# Patient Record
Sex: FEMALE | Race: BLACK OR AFRICAN AMERICAN
[De-identification: names, ages, dates, MRNs, and addresses within clinical notes are randomized per-mention and may not be internally consistent; named-entity substitution may affect disease eponyms.]

---

## 2017-12-26 ENCOUNTER — HOSPITAL ENCOUNTER (INPATIENT)
Dept: HOSPITAL 92 - ERS | Age: 47
LOS: 3 days | Discharge: HOME | DRG: 273 | End: 2017-12-29
Attending: INTERNAL MEDICINE | Admitting: INTERNAL MEDICINE
Payer: SELF-PAY

## 2017-12-26 VITALS — BODY MASS INDEX: 43.4 KG/M2

## 2017-12-26 DIAGNOSIS — I47.1: Primary | ICD-10-CM

## 2017-12-26 DIAGNOSIS — N18.2: ICD-10-CM

## 2017-12-26 DIAGNOSIS — Z88.6: ICD-10-CM

## 2017-12-26 DIAGNOSIS — I24.8: ICD-10-CM

## 2017-12-26 DIAGNOSIS — E78.5: ICD-10-CM

## 2017-12-26 DIAGNOSIS — B20: ICD-10-CM

## 2017-12-26 DIAGNOSIS — E66.01: ICD-10-CM

## 2017-12-26 DIAGNOSIS — J45.20: ICD-10-CM

## 2017-12-26 LAB
ALBUMIN SERPL BCG-MCNC: 4 G/DL (ref 3.5–5)
ALP SERPL-CCNC: 50 U/L (ref 40–150)
ALT SERPL W P-5'-P-CCNC: 19 U/L (ref 8–55)
ANION GAP SERPL CALC-SCNC: 11 MMOL/L (ref 10–20)
AST SERPL-CCNC: 24 U/L (ref 5–34)
BILIRUB SERPL-MCNC: 0.3 MG/DL (ref 0.2–1.2)
BUN SERPL-MCNC: 10 MG/DL (ref 7–18.7)
CALCIUM SERPL-MCNC: 9.4 MG/DL (ref 7.8–10.44)
CHLORIDE SERPL-SCNC: 105 MMOL/L (ref 98–107)
CK MB SERPL-MCNC: 0.6 NG/ML (ref 0–6.6)
CK MB SERPL-MCNC: 0.6 NG/ML (ref 0–6.6)
CK SERPL-CCNC: 121 U/L (ref 29–168)
CO2 SERPL-SCNC: 29 MMOL/L (ref 22–29)
CREAT CL PREDICTED SERPL C-G-VRATE: 0 ML/MIN (ref 70–130)
GLOBULIN SER CALC-MCNC: 4 G/DL (ref 2.4–3.5)
GLUCOSE SERPL-MCNC: 80 MG/DL (ref 70–105)
HGB BLD-MCNC: 13.2 G/DL (ref 12–16)
MCH RBC QN AUTO: 34 PG (ref 27–31)
MCV RBC AUTO: 103 FL (ref 81–99)
MDIFF COMPLETE?: YES
PLATELET # BLD AUTO: 183 THOU/UL (ref 130–400)
PLATELET BLD QL SMEAR: (no result)
POTASSIUM SERPL-SCNC: 3.9 MMOL/L (ref 3.5–5.1)
RBC # BLD AUTO: 3.87 MILL/UL (ref 4.2–5.4)
SODIUM SERPL-SCNC: 141 MMOL/L (ref 136–145)
SP GR UR STRIP: 1.01 (ref 1–1.04)
TROPONIN I SERPL DL<=0.01 NG/ML-MCNC: (no result) NG/ML (ref ?–0.03)
TROPONIN I SERPL DL<=0.01 NG/ML-MCNC: 0.08 NG/ML (ref ?–0.03)
TROPONIN I SERPL DL<=0.01 NG/ML-MCNC: 0.1 NG/ML (ref ?–0.03)
WBC # BLD AUTO: 2.9 THOU/UL (ref 4.8–10.8)

## 2017-12-26 PROCEDURE — 81003 URINALYSIS AUTO W/O SCOPE: CPT

## 2017-12-26 PROCEDURE — 82553 CREATINE MB FRACTION: CPT

## 2017-12-26 PROCEDURE — 84484 ASSAY OF TROPONIN QUANT: CPT

## 2017-12-26 PROCEDURE — 93653 COMPRE EP EVAL TX SVT: CPT

## 2017-12-26 PROCEDURE — 84443 ASSAY THYROID STIM HORMONE: CPT

## 2017-12-26 PROCEDURE — 71010: CPT

## 2017-12-26 PROCEDURE — 94760 N-INVAS EAR/PLS OXIMETRY 1: CPT

## 2017-12-26 PROCEDURE — 93005 ELECTROCARDIOGRAM TRACING: CPT

## 2017-12-26 PROCEDURE — 80053 COMPREHEN METABOLIC PANEL: CPT

## 2017-12-26 PROCEDURE — 93623 PRGRMD STIMJ&PACG IV RX NFS: CPT

## 2017-12-26 PROCEDURE — 36415 COLL VENOUS BLD VENIPUNCTURE: CPT

## 2017-12-26 PROCEDURE — 93010 ELECTROCARDIOGRAM REPORT: CPT

## 2017-12-26 PROCEDURE — 93621 COMP EP EVL L PAC&REC C SINS: CPT

## 2017-12-26 PROCEDURE — C1769 GUIDE WIRE: HCPCS

## 2017-12-26 PROCEDURE — 93613 INTRACARDIAC EPHYS 3D MAPG: CPT

## 2017-12-26 PROCEDURE — C1730 CATH, EP, 19 OR FEW ELECT: HCPCS

## 2017-12-26 PROCEDURE — 85025 COMPLETE CBC W/AUTO DIFF WBC: CPT

## 2017-12-26 PROCEDURE — 93306 TTE W/DOPPLER COMPLETE: CPT

## 2017-12-26 PROCEDURE — 76942 ECHO GUIDE FOR BIOPSY: CPT

## 2017-12-26 NOTE — RAD
CHEST 1 VIEW:

 

Date:  12/26/17 

 

HISTORY:  

Chest pain. 

 

COMPARISON:  

04/25/11. 

 

FINDINGS:

Cardiac silhouette is magnified by projection. Pulmonary vasculature is upper limits of normal. Media
stinum is midline. There is no lobar consolidation or evidence of pneumothorax. 

 

IMPRESSION: 

No active cardiopulmonary abnormalities are demonstrated. 

 

 

POS: [facility]

## 2017-12-27 NOTE — HP
DATE OF ADMISSION:  12/27/2017

 

PRIMARY CARE PHYSICIAN:  Dr. Seven Hickey at [facility].

 

CHIEF COMPLAINT:  Palpitations.

 

HISTORY OF PRESENT ILLNESS:  Patient is a 47-year-old female with morbid obesity, currently on phente
rmine, presented to the emergency room by the EMS with palpitations.  Please note, the patient drinks
 one Red Bull every morning over the last 2 years or so.  The patient developed rapid onset of palpit
ations along with lightheadedness and diaphoresis while she was at work.  She drove home.  Due to her
 progressive lightheadedness and palpitations, EMS was eventually called.  She denies any chest pain,
 syncope, recent immobilization, travel or drug use.  No fevers or chills reported.  No similar sympt
oms in the past.  The patient had a negative stress test in 2010.  Echocardiogram in 2010 showed norm
al EF with mild mitral and tricuspid regurgitation.  The patient was found to be in SVT by EMS, requi
ring adenosine.  She converted to normal rhythm.  I am unable to find any rhythm strip or EKG from 
e EMS or the ER.  She is currently in sinus rhythm.

 

PAST MEDICAL HISTORY:

1.  HIV on HAART.

2.  Mild intermittent asthma.

3.  Morbid obesity with a BMI 43.5 on phentermine.

4.  Hyperlipidemia, diet controlled.

5.  Daily caffeine use.

 

PAST SURGICAL HISTORY:

1.  Hysterectomy in 2010.

2.  Tubal ligation.

 

ALLERGIES:  The patient is allergic to CODEINE.

 

CURRENT HOME MEDICATIONS:  Albuterol inhaler on an as needed basis, Stribild 1 tablet daily, phenterm
ine 37.5 mg daily.

 

SOCIAL HISTORY:  Patient currently lives at home with her family.  Drinks alcohol socially, no drugs 
use, no smoking.

 

FAMILY HISTORY:  Mother with pelvic cancer, congestive heart failure in maternal grandmother.

 

REVIEW OF SYSTEMS:  The following complete review of systems was negative, unless otherwise mentioned
 in the HPI or below:

Constitutional:  Weight loss or gain, ability to conduct usual activities.

Skin:  Rash, itching.

Eyes:  Double vision, pain.

ENT/Mouth:  Nose bleeding, neck stiffness, pain, tenderness.

Cardiovascular:  Palpitations, dyspnea on exertion, orthopnea.

Respiratory:  Shortness of breath, wheezing, cough, hemoptysis, fever or night sweats.

Gastrointestinal:  Poor appetite, abdominal pain, heartburn, nausea, vomiting, constipation, or diarr
hea.

Genitourinary:  Urgency, frequency, dysuria, nocturia.

Musculoskeletal:  Pain, swelling.

Neurologic/Psychiatric:  Anxiety, depression.

Allergy/Immunologic:  Skin rash, bleeding tendency.

 

PHYSICAL EXAMINATION:

VITAL SIGNS:  In the emergency room showed, temperature 98.4, respirations 16, pulse of 97, blood pre
ssure of 132/86 with O2 saturation 100% on room air.

GENERAL:  A 47-year-old female, in no apparent distress.  Denies any chest pain, shortness of breath 
at this time.

HEENT:  Head is atraumatic, normocephalic.  Sclerae are anicteric.  Moist mucous membrane, no oral le
ron.

NECK:  Supple, no JVD appreciated.  No carotid bruit.

LUNGS:  Clear to auscultation bilaterally.  No wheezing or rales.

HEART:  S1, S2 present.  Regular rate and rhythm.  No murmurs, rubs or gallops appreciated.

ABDOMEN:  Soft.  Bowel sounds present.

EXTREMITIES:  No edema or calf tenderness.

NEUROLOGIC:  Grossly nonfocal, moves all four extremities.

PSYCHIATRY:  Alert, awake, oriented x3.

SKIN:  Warm and dry.

LYMPH NODES:  No palpable lymph nodes in the neck.

PERIPHERAL VASCULAR:  Radial pulses palpable bilaterally.

MUSCULOSKELETAL:  No joint swelling or tenderness.

 

LABORATORY FINDINGS:  CBC showed WBC of 2.9 with hemoglobin 13.2, hematocrit 39.8, platelet of 183.  
Chemistries showed sodium 141, potassium 3.9, chloride 105, bicarbonate 29, BUN 10, creatinine 1.08, 
glucose of 80, troponin maximum was 0.098.  TSH was normal.  Urinalysis was negative for WBC, bacteri
a.

 

X-RAY FINDINGS:  Chest x-ray by my review was negative for infiltrate.  Telemetry by my review showed
 sinus rhythm.

 

IMPRESSION:

1.  Supraventricular tachycardia, resolved after adenosine by EMS.  No rhythm strips in the chart.

2.  Morbid obesity, BMI of 43.5 on phentermine.

3.  Elevated troponins, probably secondary to demand ischemia from supraventricular tachycardia.  Pat
[facility] had a negative stress test in 2010.

4.  Hyperlipidemia, diet controlled.

5.  Human immunodeficiency virus on HAART.

6.  Chronic kidney disease stage 2.

7.  Leukopenia, probably secondary to human immunodeficiency virus.

8.  Daily caffeine use.

9.  Mild intermittent asthma.

 

PLAN:  The patient will be monitored on the telemetry unit.  I discussed the case with Dr. Oden, Elec
trophysiology.  We will keep her n.p.o.  Patient was extensively counseled to quit daily Red Bulls.  
Phentermine may be causing her tachycardia as well.  I advised her to discuss with Dr. Oden if it is 
advisable to continue phentermine.

 

Plan of care was discussed with the patient in detail and she stated understanding.

## 2017-12-27 NOTE — CON
DATE OF CONSULTATION:  12/27/2017

 

REFERRING PHYSICIAN:  Kenroy ADNIEL I am seeing Ms. Mendoza at our [facility] telemetry floor as an electrophysiology consultant. 
 Her problems are:

1.  Paroxysmal supraventricular tachycardia, termination with adenosine noted.

2.  No prior history of heart disease or heart attacks.

3.  Human immunodeficiency virus positive, on HAART.

 

ALLERGIES:  CODEINE.

 

MEDICATIONS AT HOME:  Included phentermine 37.5 mg daily, Stribild 1 tablet q.p.m., albuterol q.6 h p
.r.n.

 

SUBJECTIVE:  Ms. Mendoza is here with episode of palpitations.  She noticed this while she was at work 
and she drove home, but the symptoms continued unabated.  She did not have any chest pains, did not p
assed out.  No stroke-like symptoms.  No neurological deficits are found.  Eventually, she called EMS
 and EMS diagnosed SVT and gave her a dose of adenosine, which promptly corrected the rhythm back to 
sinus rhythm.  She had no PND, orthopnea, lower extremity edema to suggest  fluid overload.  No fever
, chills, cough.  She does drink excessive amount of caffeine with Red Bull.

 

REVIEW OF SYSTEMS:  Rest of 12-point system otherwise unremarkable.

 

PAST MEDICAL HISTORY:  As above.  History of morbid obesity, BMI 43.5; hyperlipidemia daily caffeine 
use.

 

SOCIAL HISTORY:  Patient denies smoking, ETOH or drug abuse.  Does drink alcohol only socially.

 

FAMILY HISTORY:  Significant for mother having pelvic cancer and CHF in the maternal grandmother.

 

PHYSICAL EXAMINATION:

VITAL SIGNS:  Blood pressure is 127/76, heart rate 86, respirations 16, temperature 98 degrees Fahren
heit.

GENERAL:  She is alert and oriented woman who is morbidly obese, in no apparent distress.

NECK:  Supple.  Jugular veins are not distended.

CHEST:  Coarse without crackles.

CARDIOVASCULAR:  Heart sounds are regular to rate and rhythm.  No murmur or gallop.

ABDOMEN:  Benign.  Bowel sounds positive.

EXTREMITIES:  Lower extremities without edema, clubbing or cyanosis.  Pulses are adequate.

NEUROLOGIC:  Patient is nonfocal.

MUSCULOSKELETAL:  Without joint swelling or deformities.

SKIN:  Without rash.

 

DATABASE:  EKG the initial EKG reveals sinus rhythm with no sinus ST-T changes.  OR normal.  No preex
citation is noted.

 

LABORATORY DATA:  White count is 2.9, hemoglobin 13.2, platelet count 183.  Sodium 141, potassium 3.9
, BUN is 10, creatinine 1.08.

 

AST and ALT are 24 and 19.  Troponins are 0.01, 0.077, 0.098 noted.

 

ASSESSMENT AND PLAN:  Ms. Mendoza is a 47-year-old woman without much cardiac history.  She does have h
istory of human immunodeficiency virus, which is well suppressed with HAART according to H&P.  White 
cell count is mildly depressed.  She is here with episode of palpitations which was diagnosed as supr
aventricular tachycardia by EMS _____.

 

My plan at this point:  

1.  We discussed the treatment options for treatment of arrhythmias for further observation, AV mary
 blocking agents versus ablation was all discussed with her.  At this point she is interested in proc
eeding with an EP study and ablation procedure.  I detailed the procedure to her including the chance
 of infection, bleeding, tamponade, retroperitoneal bleeds, strokes in case of left-sided procedures,
 bradycardia, AV block, might require pacing and recurrence of the arrhythmias and she understands, s
he is willing to proceed.  We will schedule her for possibly tomorrow if schedule allows.  I will obt
ain an echocardiogram to assess her cardiac function.

 

Thank you again for allowing me to participate in the care of this patient.

## 2017-12-28 LAB
ALBUMIN SERPL BCG-MCNC: 3.6 G/DL (ref 3.5–5)
ALP SERPL-CCNC: 45 U/L (ref 40–150)
ALT SERPL W P-5'-P-CCNC: 22 U/L (ref 8–55)
ANION GAP SERPL CALC-SCNC: 9 MMOL/L (ref 10–20)
AST SERPL-CCNC: 25 U/L (ref 5–34)
BILIRUB SERPL-MCNC: 0.2 MG/DL (ref 0.2–1.2)
BUN SERPL-MCNC: 11 MG/DL (ref 7–18.7)
CALCIUM SERPL-MCNC: 9.1 MG/DL (ref 7.8–10.44)
CHLORIDE SERPL-SCNC: 105 MMOL/L (ref 98–107)
CO2 SERPL-SCNC: 28 MMOL/L (ref 22–29)
CREAT CL PREDICTED SERPL C-G-VRATE: 160 ML/MIN (ref 70–130)
GLOBULIN SER CALC-MCNC: 3.7 G/DL (ref 2.4–3.5)
GLUCOSE SERPL-MCNC: 96 MG/DL (ref 70–105)
HGB BLD-MCNC: 12.7 G/DL (ref 12–16)
MCH RBC QN AUTO: 34.7 PG (ref 27–31)
MCV RBC AUTO: 104 FL (ref 81–99)
MDIFF COMPLETE?: YES
PLATELET # BLD AUTO: 154 THOU/UL (ref 130–400)
PLATELET BLD QL SMEAR: (no result)
POTASSIUM SERPL-SCNC: 4.3 MMOL/L (ref 3.5–5.1)
RBC # BLD AUTO: 3.64 MILL/UL (ref 4.2–5.4)
SODIUM SERPL-SCNC: 138 MMOL/L (ref 136–145)
WBC # BLD AUTO: 2.1 THOU/UL (ref 4.8–10.8)

## 2017-12-28 PROCEDURE — 02583ZZ DESTRUCTION OF CONDUCTION MECHANISM, PERCUTANEOUS APPROACH: ICD-10-PCS | Performed by: INTERNAL MEDICINE

## 2017-12-28 PROCEDURE — 4A023FZ MEASUREMENT OF CARDIAC RHYTHM, PERCUTANEOUS APPROACH: ICD-10-PCS | Performed by: INTERNAL MEDICINE

## 2017-12-28 PROCEDURE — 4A0234Z MEASUREMENT OF CARDIAC ELECTRICAL ACTIVITY, PERCUTANEOUS APPROACH: ICD-10-PCS | Performed by: INTERNAL MEDICINE

## 2017-12-28 PROCEDURE — 02K83ZZ MAP CONDUCTION MECHANISM, PERCUTANEOUS APPROACH: ICD-10-PCS | Performed by: INTERNAL MEDICINE

## 2017-12-28 NOTE — PDOC.PN
- Subjective


Encounter Start Date: 12/28/17


Encounter Start Time: 09:00





Patient seen and examined. No new complaints. No overnight events





- Objective


Resuscitation Status: 


 











Resuscitation Status           FULL:Full Resuscitation














MAR Reviewed: Yes


Vital Signs & Weight: 


 Vital Signs (12 hours)











  Temp Pulse Resp BP Pulse Ox


 


 12/28/17 18:15  98.2 F  82  20  117/70  96


 


 12/28/17 11:45  97.8 F  74  16  132/81  100








 Weight











Weight                         294 lb 11.2 oz














I&O: 


 











 12/27/17 12/28/17 12/29/17





 06:59 06:59 06:59


 


Intake Total   120


 


Output Total   800


 


Balance   -680











Result Diagrams: 


 12/28/17 04:05





 12/28/17 04:05


EKG Reviewed by me: Yes (Tele SR, No SVT)





Phys Exam





- Physical Examination


Constitutional: NAD


Respiratory: no wheezing, no rales, no rhonchi, clear to auscultation bilateral


Cardiovascular: RRR, no significant murmur, no rub


no heaves/pulsations


Gastrointestinal: soft, non-tender, no distention, positive bowel sounds


Musculoskeletal: no edema, pulses present


Neurological: non-focal, normal sensation, moves all 4 limbs


Psychiatric: normal affect, A&O x 3





Dx/Plan





- Plan


DVT proph w/SCDs





IMPRESSION:


1.  Supraventricular tachycardia, resolved after adenosine.


2.  Morbid obesity, BMI of 43.5 on phentermine.


3.  Elevated troponins, probably secondary to demand ischemia from 

supraventricular tachycardia.


4.  Hyperlipidemia, diet controlled.


5.  Human immunodeficiency virus on HAART.


6.  Chronic kidney disease stage 2.


7.  Leukopenia, probably secondary to human immunodeficiency virus.


8.  Daily caffeine use.


9.  Mild intermittent asthma.


 


PLAN: 


* Ablation today


* Cont to monitor


* Advised patient to minimize Caffeine use.


* Cont HAART


* Cont current meds as below








Review of Systems





- Review of Systems


Respiratory: negative: Cough, Dry, Shortness of Breath, Hemoptysis, SOB with 

Excertion, Pleuritic Pain, Sputum, Wheezing


Cardiovascular: negative: chest pain, palpitations, orthopnea, paroxysmal 

nocturnal dyspnea, edema, light headedness


Gastrointestinal: negative: Nausea, Vomiting, Abdominal Pain, Diarrhea, 

Constipation, Melena, Hematochezia





- Medications/Allergies


Allergies/Adverse Reactions: 


 Allergies











Allergy/AdvReac Type Severity Reaction Status Date / Time


 


codeine Allergy   Verified 12/26/17 22:57











Medications: 


 Current Medications





Acetaminophen (Tylenol)  650 mg PO Q4H PRN


   PRN Reason: Mild Pain 1-3


Al Hydroxide/Mg Hydroxide (Maalox)  15 ml PO Q4H PRN


   PRN Reason: Heartburn  or Indigestion


Albuterol Sulfate (Proventil Hfa)  2 puff INH Q6H PRN


   PRN Reason: Wheezing


Aspirin (Aspirin)  325 mg PO DAILY [facility]


   Last Admin: 12/28/17 18:40 Dose:  Not Given


Diphenhydramine HCl (Benadryl)  25 mg PO Q6H PRN


   PRN Reason: Itching


Docusate Sodium (Colace)  100 mg PO BID [facility]


   Last Admin: 12/28/17 20:39 Dose:  100 mg


Enoxaparin Sodium (Lovenox)  40 mg SC 0900 [facility]


   Last Admin: 12/28/17 18:40 Dose:  Not Given


Famotidine (Pepcid)  20 mg PO BID [facility]


   Last Admin: 12/28/17 20:39 Dose:  20 mg


Guaifenesin (Robitussin Sf)  200 mg PO Q4H PRN


   PRN Reason: Cough


Nitroglycerin (Nitrostat)  0.4 mg SL Q5MIN PRN


   PRN Reason: Chest Pain


Ondansetron HCl (Zofran Odt)  4 mg PO Q6H PRN


   PRN Reason: Nausea/Vomiting


Ondansetron HCl (Zofran)  4 mg IVP Q6H PRN


   PRN Reason: Nausea/Vomiting


Stribild (Herlinda/Mady/ (Emtr/Teno) Tablet)  1 each PO [facility]PM-[facility]


Senna (Senokot)  2 tab PO HSPRN PRN


   PRN Reason: Constipation


Silver Sulfadiazine (Silvadene)  0 gm TOP Q12H PRN


   PRN Reason: Rash/Topical Irritation


Sodium Chloride (Flush - Normal Saline)  10 ml IVF PRN PRN


   PRN Reason: Saline Flush


Temazepam (Restoril)  15 mg PO HSPRN PRN


   PRN Reason: Insomnia


Tramadol HCl (Ultram)  50 mg PO Q4H PRN


   PRN Reason: FOR MODERATE PAIN 4-6

## 2017-12-29 VITALS — SYSTOLIC BLOOD PRESSURE: 131 MMHG | DIASTOLIC BLOOD PRESSURE: 72 MMHG | TEMPERATURE: 98.1 F

## 2017-12-29 NOTE — EKG
Test Reason : 

Blood Pressure : ***/*** mmHG

Vent. Rate : 093 BPM     Atrial Rate : 093 BPM

   P-R Int : 150 ms          QRS Dur : 080 ms

    QT Int : 368 ms       P-R-T Axes : 047 -03 032 degrees

   QTc Int : 457 ms

 

Normal sinus rhythm

Normal ECG

When compared with ECG of 26-DEC-2017 13:19, (Unconfirmed)

No significant change was found

Confirmed by DR. JOHNNIE SCOTT ([id number]) on 12/29/2017 5:01:06 PM

 

Referred By:  [facility]           Confirmed By:DR. JOHNNIE SCOTT

## 2017-12-29 NOTE — DIS
DATE OF DISCHARGE:  12/29/2017

 

DISCHARGE DISPOSITION:  Home.

 

FOLLOWUP:  Follow up with primary care physician, Dr. Hickey at [facility], in 1 week.  Follow 
up with Electrophysiology, Dr. Oden, as scheduled.

 

ALLERGIES:  The patient is allergic to CODEINE.

 

DISCHARGE MEDICATIONS:

1.  ProAir as needed.

2.  Phentermine 37.5 mg daily.

3.  Stribild 1 tablet p.o. daily.

 

INPATIENT CONSULTANTS:  Electrophysiology, Dr. Oden.

 

The patient was seen and examined on the day of discharge, denies any new complaints.  No chest pain,
 shortness of breath, palpitations,

 

BRIEF HOSPITAL COURSE:  The patient is a 47-year-old female with morbid obesity, currently on phenter
mine with daily caffeine use, presented to the hospital with palpitations.  She was found to be in SV
T, requiring adenosine by the EMS.  Please refer to the history and physical dated 12/27/2017 for fur
ther details.

 

The patient was admitted to the hospital with a diagnosis of supraventricular tachycardia that resolv
ed after adenosine.  She was seen by Electrophysiology, Dr. Oden.  She underwent radiofrequency ablat
ion of inducible AV mary reentrant tachycardia.  She was monitored on the telemetry unit.  She has b
een cleared by Electrophysiology for discharge.  She was found to have indeterminate troponins, proba
anni secondary to significant tachycardia.

 

FINAL DIAGNOSES:

1.  Supraventricular tachycardia, status post radiofrequency ablation.  She also received adenosine b
y the EMS.

2.  Morbid obesity with a BMI of 43.5 on phentermine.

3.  Daily caffeine use.  Patient was advised to minimize caffeine.

4.  Elevated troponins, probably secondary to demand ischemia from supraventricular tachycardia.  The
 patient had a negative stress test in 2010.  An outpatient Cardiology followup will be beneficial.

5.  Hyperlipidemia.

6.  Human immunodeficiency virus, on HAART.

7.  Chronic kidney disease, stage 2.

8.  Leukopenia.

9.  Mild intermittent asthma.

 

Total time coordinating the discharge of this patient was 33 minutes.

## 2017-12-29 NOTE — CCLSPC
DATE OF SERVICE:  12/28/2017

 

ELECTROPHYSIOLOGY STUDY AND RADIOFREQUENCY ABLATION REPORT

 

REFERRING PHYSICIAN:  Dr. Theodore.

 

REASON FOR PROCEDURE:  Ms. Mendoza is a 47-year-old woman with a history of HIV infection, but no prior
 history of cardiac abnormalities in the past.  She underwent an echocardiogram on this admission rev
ealing no significant LV dysfunction.  She was admitted with episode of tachycardia, which by EMS rep
ort was SVT and terminated with adenosine.

 

Here for EP study and radiofrequency ablation.

 

PROCEDURE IN DETAIL:  The patient received deep sedation by Anesthesia specialist.  After adequate le
gurwinder of sedation achieved, the left femoral vein was accessed after lidocaine and subcutaneous analges
ia under ultrasound guidance and two-short sheath in a 6 and 8 Bangladeshi sheaths were introduced.  Throu
gh, a decapolar catheter was advanced to the CS position and an octapolar catheter was advanced to th
e RV His bundle and right atrial position.  With this catheter, basic EP study was performed with the
 following findings.

 

The baseline cycle 730 milliseconds sinus rhythm,  milliseconds, QRS 67 milliseconds, QT was 34
0 milliseconds, AH 75 milliseconds, HV 47 milliseconds.  AV Wenckebach cycle length was 270 milliseco
nds, retrograde Wenckebach cycle length was 270 milliseconds.  Concentric retrograde VA activation wa
s noted.  AV mary ERP was measured to ------ 180 milliseconds with dual AV mary physiology was pres
ent.  With burst atrial pacing, but able to induce a narrow complex supraventricular tachycardia with
 very short VA timing suggestive of AV mary reentrant tachycardia.  Ventricular overdrive pacing ter
minated the arrhythmia.

 

Decision was made to ablate his slow pathway at this point.

 

The right femoral venous area was anesthetized with lidocaine and the ultrasound guidance which was a
ccessed and an 8-Bangladeshi short sheath was introduced.  A regular 4-mm ablation catheter was advanced t
o the AV mary area in the right atrium and 3D mapping of the right atrium was obtained.  His bundle 
clot was also performed.  Following that, the slow pathway area of multiple ablations were delivered 
------.  These bursts were at 30 basurto and 40 basurto in strength.  During the ablation burns, junction
al beats were observed.  Following that, repeated ERP testing revealed no evidence of slow pathway.  
Burst atrial pacing and ventricular pacing did not induce recurrent SVT.

 

CONCLUSION:

1.  Inducible AV mary reentrant tachycardia.

2.  Slow pathway ablation eliminating the inducibility of the tachycardia.

 

PLAN:  Routine postoperative care.

 

POS: [facility]

## 2017-12-29 NOTE — PRG
DATE OF SERVICE:  12/29/2017

 

SUBJECTIVE:  Mrs. Mendoza seems to be doing well one day after her slow pathway ablation for AV mary r
eentrant tachycardia.  She has minimal groin tenderness at the catheter insertion site.  No obvious h
ematoma.  No chest pains.  She is hemodynamically stable.

 

OBJECTIVE DATA:

VITAL SIGNS:  Blood pressure is 107/70, heart rate 70, respirations 18, temperature 97.8 degrees Fahr
enheit.

GENERAL:  This is a morbidly obese woman in no apparent distress.

NECK:  Supple.  Jugular veins difficult to see, but not appear to be distended.

CHEST:  Coarse, no crackles.

CARDIOVASCULAR:  Heart sounds are regular to rate and rhythm.  No murmur or gallop.

ABDOMEN:  Benign.  Bowel sounds positive.

EXTREMITIES:  Lower extremities without edema, clubbing, or cyanosis.

 

DATABASE:  EKGs reveal sinus rhythm with no significant ST changes.  _____.

 

ASSESSMENT AND PLAN:  Mrs. Mendoza is a pleasant 47-year-old female with a new onset of supraventricula
r tachycardia, which was determined to be an AV mary reentrant tachycardia by EP study and slow path
way modification eliminating re-inducibility of the tachycardia.  She is doing well one day post-abla
tion and stable for discharge.  Discussed with Dr. Theodore.  Follow up as needed.

## 2019-03-22 ENCOUNTER — HOSPITAL ENCOUNTER (EMERGENCY)
Dept: HOSPITAL 92 - ERS | Age: 49
Discharge: HOME | End: 2019-03-22
Payer: COMMERCIAL

## 2019-03-22 DIAGNOSIS — E78.5: ICD-10-CM

## 2019-03-22 DIAGNOSIS — S52.122A: ICD-10-CM
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
## 2019-03-22 DIAGNOSIS — J45.909: ICD-10-CM

## 2019-03-22 DIAGNOSIS — B20: ICD-10-CM

## 2019-03-22 DIAGNOSIS — S52.042A: Primary | ICD-10-CM

## 2019-03-22 PROCEDURE — 24650 CLTX RDL HEAD/NCK FX WO MNPJ: CPT

## 2019-03-22 PROCEDURE — 96372 THER/PROPH/DIAG INJ SC/IM: CPT

## 2019-03-22 NOTE — RAD
LEFT ELBOW FOUR VIEWS:

3/22/19

 

HISTORY: 

Fall. Pain. 

 

FINDINGS:  

Lateral view is suboptimal. Based on one of the oblique projections, the possibility of a nondisplace
d radial head fracture is raised. Limited evaluation for the presence or absence of a joint effusion.
 On the lateral projection, there is also an abnormal ossific density which is of uncertain etiology.
 

 

IMPRESSION:  

Possible fractures. Correlate clinically. Additional imaging if clinically warranted. 

 

POS: [facility]

## 2019-03-22 NOTE — RAD
TWO VIEWS LEFT FOREARM:

3/22/19

 

HISTORY: 

Tripped over a gas hose at gas station. Pain.

 

FINDINGS:  

Limited evaluation due to patient positioning. The possibility of a radial head fracture cannot be ex
cluded on this examination. Dedicated right elbow radiograph series is recommended. Otherwise, the re
mainder of the radius and visualized ulna are unremarkable. 

 

IMPRESSION:  

Questionable radial head fracture. Consider dedicated elbow imaging. 

 

POS: [facility]

## 2019-03-22 NOTE — RAD
LEFT HAND THREE VIEWS:

3/22/19

 

HISTORY:

Fall. Injury. Pain. Decreased . 

 

FINDINGS:  

No fracture. No cortical irregularity. No periosteal reaction. 

 

IMPRESSION:  

No fracture. 

 

POS: [facility]

## 2019-03-22 NOTE — RAD
LEFT SHOULDER THREE VIEWS:

3/22/19

 

HISTORY: 

Pain. Fall. 

 

COMPARISON:  

9/11/14. 

 

FINDINGS:  

Glenohumeral joint space is preserved. No definite fracture or dislocation. The visualized left ribs 
are unremarkable. 

 

IMPRESSION:  

No fracture. 

 

POS: [facility]

## 2021-03-25 ENCOUNTER — HOSPITAL ENCOUNTER (EMERGENCY)
Dept: HOSPITAL 92 - ERS | Age: 51
LOS: 1 days | Discharge: HOME | End: 2021-03-26
Payer: SELF-PAY

## 2021-03-25 DIAGNOSIS — S83.91XA: Primary | ICD-10-CM
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
## 2021-03-25 DIAGNOSIS — E78.5: ICD-10-CM
